# Patient Record
Sex: MALE | Race: WHITE
[De-identification: names, ages, dates, MRNs, and addresses within clinical notes are randomized per-mention and may not be internally consistent; named-entity substitution may affect disease eponyms.]

---

## 2019-09-28 ENCOUNTER — HOSPITAL ENCOUNTER (EMERGENCY)
Dept: HOSPITAL 74 - FER | Age: 39
Discharge: HOME | End: 2019-09-28
Payer: COMMERCIAL

## 2019-09-28 VITALS — SYSTOLIC BLOOD PRESSURE: 108 MMHG | DIASTOLIC BLOOD PRESSURE: 69 MMHG | HEART RATE: 65 BPM

## 2019-09-28 VITALS — TEMPERATURE: 98 F

## 2019-09-28 VITALS — BODY MASS INDEX: 27 KG/M2

## 2019-09-28 DIAGNOSIS — E10.9: ICD-10-CM

## 2019-09-28 DIAGNOSIS — Z88.8: ICD-10-CM

## 2019-09-28 DIAGNOSIS — R07.9: Primary | ICD-10-CM

## 2019-09-28 DIAGNOSIS — Z79.4: ICD-10-CM

## 2019-09-28 LAB
ALBUMIN SERPL-MCNC: 3.9 G/DL (ref 3.4–5)
ALP SERPL-CCNC: 64 U/L (ref 45–117)
ALT SERPL-CCNC: 20 U/L (ref 13–61)
ANION GAP SERPL CALC-SCNC: 5 MMOL/L (ref 8–16)
AST SERPL-CCNC: 18 U/L (ref 15–37)
BASOPHILS # BLD: 0.9 % (ref 0–2)
BILIRUB SERPL-MCNC: 1 MG/DL (ref 0.2–1)
BUN SERPL-MCNC: 22 MG/DL (ref 7–18)
CALCIUM SERPL-MCNC: 8.9 MG/DL (ref 8.5–10)
CHLORIDE SERPL-SCNC: 102 MMOL/L (ref 98–107)
CO2 SERPL-SCNC: 32 MMOL/L (ref 21–32)
CREAT SERPL-MCNC: 0.9 MG/DL (ref 0.55–1.3)
DEPRECATED RDW RBC AUTO: 12.1 % (ref 11.9–15.9)
EOSINOPHIL # BLD: 1.8 % (ref 0–4.5)
GLUCOSE SERPL-MCNC: 227 MG/DL (ref 74–106)
HCT VFR BLD CALC: 47.6 % (ref 35.4–49)
HGB BLD-MCNC: 15.9 GM/DL (ref 11.7–16.9)
LYMPHOCYTES # BLD: 39.1 % (ref 8–40)
MCH RBC QN AUTO: 29.2 PG (ref 25.7–33.7)
MCHC RBC AUTO-ENTMCNC: 33.5 G/DL (ref 32–35.9)
MCV RBC: 87.2 FL (ref 80–96)
MONOCYTES # BLD AUTO: 10 % (ref 3.8–10.2)
NEUTROPHILS # BLD: 48.2 % (ref 42.8–82.8)
PLATELET # BLD AUTO: 269 K/MM3 (ref 134–434)
PMV BLD: 8.4 FL (ref 7.5–11.1)
POTASSIUM SERPLBLD-SCNC: 4.6 MMOL/L (ref 3.5–5.1)
PROT SERPL-MCNC: 6.6 G/DL (ref 6.4–8.2)
RBC # BLD AUTO: 5.46 M/MM3 (ref 4–5.6)
SODIUM SERPL-SCNC: 139 MMOL/L (ref 136–145)
WBC # BLD AUTO: 4.3 K/MM3 (ref 4–10.8)

## 2019-09-28 NOTE — PDOC
Attending Attestation





- Resident


Resident Name: Keaton Trent





- ED Attending Attestation


I have performed the following: I have examined & evaluated the patient, The 

case was reviewed & discussed with the resident, I agree w/resident's findings 

& plan





- HPI


HPI: 





09/28/19 12:06


39-year-old man with a history of type 1 diabetes presents complaining of left-

sided chest pain and left arm tingling for 3 days.  No other cardiac risk 

factors, active, uses insulin pump for tight control of his diabetes, 

hemoglobin A1c was last measured at 7.





The symptoms started while exercising on a bike, but they continued at rest.  

Patient had similar symptoms all day yesterday with left-sided chest pain and 

left arm tingling.  His wife is a physician, and he told her this morning.  She 

did an EKG which showed some J-point elevation in the precordial leads.  She 

states that she feels this is low risk chest pain, however she requested ED 

evaluation.  She sent him to the ED requesting a single troponin given that his 

symptoms have been present for the most part over the last 48 hours.





- Physicial Exam


PE: 





09/28/19 12:07


Vital signs noted, normal.  Patient is awake, alert, fully oriented, appears 

comfortable.  Skin is warm and dry.  Lungs are clear.  Heart is regular rhythm 

without gallop or murmur.  Extremities are warm and well perfused.  Impression: 

Normal physical exam.





- Medical Decision Making





09/28/19 12:08


Impression: 39-year-old man with a history of type 1 diabetes with atypical 

chest pain lasting for several days, onset on Thursday, still present today on 

Saturday.  Given aspirin by his wife 325 mg this morning.





Twelve-lead EKG was reviewed by me from the one done by his wife this morning.  





Repeat EKG was performed in the emergency department.  There is sinus 

bradycardia at a rate of 59 bpm.  The axis is normal.  The intervals are 

normal.  There is J-point elevation throughout the precordial leads from V1 to 

V6, as well as in some of the limb leads.  





Laboratory studies were performed, troponin and other labs normal.





Impression: Atypical chest pain, history is atypical for acute coronary syndrome

, physical examination is normal, twelve-lead EKG shows benign-appearing J-

point elevation, and troponin is negative.





Phone call placed to the patient's wife regarding ED findings.  Patient will 

follow-up with his physician next week.





Stable for discharge with follow-up.





 Laboratory Results - last 24 hr











  09/28/19 09/28/19 09/28/19





  11:00 11:00 11:00


 


WBC  4.3  


 


RBC  5.46  


 


Hgb  15.9  


 


Hct  47.6  


 


MCV  87.2  


 


MCH  29.2  


 


MCHC  33.5  


 


RDW  12.1  


 


Plt Count  269  


 


MPV  8.4  


 


Absolute Neuts (auto)  2.1  


 


Neutrophils %  48.2  


 


Lymphocytes %  39.1  


 


Monocytes %  10.0  


 


Eosinophils %  1.8  


 


Basophils %  0.9  


 


Sodium   139 


 


Potassium   4.6 


 


Chloride   102 


 


Carbon Dioxide   32 


 


Anion Gap   5 L 


 


BUN   22.0 H 


 


Creatinine   0.9 


 


Est GFR (CKD-EPI)AfAm   126.02 


 


Est GFR (CKD-EPI)NonAf   108.73 


 


Random Glucose   227 H 


 


Calcium   8.9 


 


Total Bilirubin   1.0 


 


AST   18 


 


ALT   20 


 


Alkaline Phosphatase   64 


 


Troponin I    < 0.03


 


Total Protein   6.6 


 


Albumin   3.9

## 2019-09-28 NOTE — PDOC
History of Present Illness





- General


Chief Complaint: Chest Pain


Stated Complaint: CHEST PAIN





- History of Present Illness


Initial Comments: 





The pt is a 39M w/ a history of T1DM who presents for evaluation of 3 days of 

intermittent chest pain. The pain began while cycling 2 days ago, he was able 

to make it through his workout and the pain subsided later that day. The pain 

returned yesterday evening while walking and has persisted since that time. It 

is substernal tightness that is non-radiating, constant, worse with deep 

inspiration, and not exacerbated or alleviated by anything he can identify. 


He denies fevers/chills, HA, vision changes, trouble breathing, N/V, or having 

these symptoms previously.


09/28/19 10:46








Past History





- Past Medical History


Allergies/Adverse Reactions: 


 Allergies











Allergy/AdvReac Type Severity Reaction Status Date / Time


 


cefaclor [From Ceclor] Allergy Unknown  Verified 09/28/19 10:29











Home Medications: 


Ambulatory Orders





Insulin (Novolog) [Novolog] 0 units SQ AC 09/28/19 











**Review of Systems





- Review of Systems


Able to Perform ROS?: Yes


Comments:: 





GENERAL/CONSTITUTIONAL: No fever or chills. No weakness


HEAD, EYES, EARS, NOSE AND THROAT: No change in vision. No change in hearing. 

No sore throat


CARDIOVASCULAR: No shortness of breath


RESPIRATORY: Denies cough, hemoptysis


GASTROINTESTINAL: No nausea, vomiting, diarrhea or constipation


GENITOURINARY: No dysuria, frequency, or change in urination


MUSCULOSKELETAL: No joint or muscle swelling or pain. No neck or back pain


SKIN: No rash


NEUROLOGIC: No headache, vertigo, loss of consciousness, or change in strength/

sensation


ENDOCRINE: No increased thirst. No abnormal weight change


HEMATOLOGIC/LYMPHATIC: No anemia, easy bleeding, or history of blood clots


ALLERGIC/IMMUNOLOGIC: No hives or skin allergy





09/28/19 10:30





Is the patient limited English proficient: No





*Physical Exam





- Vital Signs





Vital Signs











Temp Pulse Resp BP Pulse Ox


 


 98.2 F   75   16   122/90   98 


 


 09/28/19 10:29 09/28/19 10:29 09/28/19 10:29 09/28/19 10:29 09/28/19 10:29 09/28/19 10:50








- Physical Exam


Comments: 





GENERAL: Awake, alert, and oriented to person/place/time, in no acute distress


HEAD: No signs of trauma, normocephalic, atraumatic 


EYES: PERRLA, EOMI, sclera anicteric, conjunctiva clear


ENT: Hearing grossly normal, nares patent, oropharynx clear without exudates. 

Moist mucosa


LUNGS: No distress, speaks in full sentences, clear to auscultation bilaterally


CHEST: No chest wall TTP


HEART: Regular rate and rhythm, normal S1 and S2, no murmurs appreciated, 

peripheral pulses normal and equal bilaterally


ABDOMEN: Soft, nontender, normoactive bowel sounds. No guarding, no rebound. No 

masses


EXTREMITIES: Normal inspection, Normal range of motion, no edema. No clubbing 

or cyanosis


NEUROLOGICAL: Cranial nerves II through XII grossly intact. Normal speech, 

normal gait, no focal sensorimotor deficits


SKIN: Warm, Dry





09/28/19 10:30











**Heart Score/ECG Review





- History


History: Slightly suspicious





- Electrocardiogram


EKG: Non specific repolarization disturbance





- Age


Age: </= 45





- Risk Factors


Risk Factors Heart Score: Yes Hx Diabetes


Based on the list above the patient has:: 1-2 risk factors





- Troponin


Troponin: </= normal limit





- Score


Heart Score - Total: 2





ED Treatment Course





- LABORATORY


CBC & Chemistry Diagram: 


 09/28/19 11:00





 09/28/19 11:00





Medical Decision Making





- Medical Decision Making





The pt is a 39M w/ a history of T1DM who presents for evaluation of 3 days of 

chest pain





ED Course


Pt s/p ASA at Urgent Care


HEART score 2


CMP, CBC, Trop I


CXR


ECG


09/28/19 10:51





ECG w/ sinus bradycardia at 59; notable for J-point elevation in V1-6 but no 

pathologic ROYCE, no axis deviation


CXR w/o acute pathology


09/28/19 11:31





Trop I neg


Lytes wnl


No anemia


No leukocytosis


LFTs wnl





Pt's not not likely due to cardiac etiology at this time


Pt and his wife updated on findings


Plan for D/C w/ PCP f/u


Discharge instructions and return precautions given


Patient in agreement and verbalized understanding


Dispo: Home


09/28/19 12:10








Discharge





- Discharge Information


Problems reviewed: Yes


Clinical Impression/Diagnosis: 


Chest pain


Qualifiers:


 Chest pain type: unspecified Qualified Code(s): R07.9 - Chest pain, unspecified





Condition: Good


Disposition: HOME





- Admission


No





- Follow up/Referral


Referrals: 


Tawanda Steinberg [Primary Care Provider] - 





- Patient Discharge Instructions


Patient Printed Discharge Instructions:  DI for Atypical Chest Pain


Additional Instructions: 


You were seen in the Emergency Department for chest pain. Your Troponin was 

negative and the remainder of your labs were also unremarkable. Review the 

handout provided at discharge. Follow up with your primary care provider within 

a week. Return to the Emergency Department if you develop a change/worsening of 

your pain, trouble breathing, chest pressure, vision changes, dizziness, 

changes in sensation/weakness, worsening symptoms, or any new/concerning 

symptoms.





- Post Discharge Activity

## 2019-09-30 NOTE — EKG
Test Reason : 

Blood Pressure : ***/*** mmHG

Vent. Rate : 059 BPM     Atrial Rate : 059 BPM

   P-R Int : 160 ms          QRS Dur : 088 ms

    QT Int : 416 ms       P-R-T Axes : 064 048 042 degrees

   QTc Int : 411 ms

 

SINUS BRADYCARDIA

OTHERWISE NORMAL ECG

NO PREVIOUS ECGS AVAILABLE

Confirmed by JAMEL NESS MD ([id number]) on 9/30/2019 1:23:08 PM

 

Referred By: ABDIAS TIDWELL           Confirmed By:JAMEL NESS MD